# Patient Record
Sex: MALE | Race: WHITE | NOT HISPANIC OR LATINO | URBAN - METROPOLITAN AREA
[De-identification: names, ages, dates, MRNs, and addresses within clinical notes are randomized per-mention and may not be internally consistent; named-entity substitution may affect disease eponyms.]

---

## 2023-01-06 ENCOUNTER — EMERGENCY (EMERGENCY)
Facility: HOSPITAL | Age: 23
LOS: 1 days | Discharge: HOME | End: 2023-01-06
Attending: EMERGENCY MEDICINE
Payer: SELF-PAY

## 2023-01-06 VITALS
RESPIRATION RATE: 20 BRPM | DIASTOLIC BLOOD PRESSURE: 83 MMHG | WEIGHT: 192.9 LBS | OXYGEN SATURATION: 99 % | HEART RATE: 55 BPM | TEMPERATURE: 98 F | SYSTOLIC BLOOD PRESSURE: 123 MMHG

## 2023-01-06 PROCEDURE — 99284 EMERGENCY DEPT VISIT MOD MDM: CPT

## 2023-01-06 PROCEDURE — 99282 EMERGENCY DEPT VISIT SF MDM: CPT

## 2023-01-06 NOTE — ED PROVIDER NOTE - CLINICAL SUMMARY MEDICAL DECISION MAKING FREE TEXT BOX
22 yr old male with no hx presents to ed c/o feeling unwell still after being told he had the flu at [facility] couple days ago and had ct abd/pelvis done last night for persistent abd pain. pt received PPi and pepcid. pt also given maalox which he is taking. no fever. no drug use, no alcohol. pt has appt with gi Monday. no dysuria.    pt with ct work up, labs and already on gi cocktail. pt exam rather unremarkable. hemodynamically stable. instructed to continue with treatment plan and see GI on monday. no clinical concern for intraabd path. could be due to stress induced gastritis vs h pylori. dc

## 2023-01-06 NOTE — ED PROVIDER NOTE - OBJECTIVE STATEMENT
22 yr old male with no hx presents to ed c/o feeling unwell still after being told he had the flu at [facility] couple days ago and had ct abd/pelvis done last night for persistent abd pain. pt received PPi and pepcid. pt also given maalox which he is taking. no fever. no drug use, no alcohol. pt has appt with gi Monday. no dysuria.

## 2023-01-06 NOTE — ED PROVIDER NOTE - PATIENT PORTAL LINK FT
You can access the FollowMyHealth Patient Portal offered by [facility] by registering at the following website: [email or web address]. By joining [facility]’s FollowMyHealth portal, you will also be able to view your health information using other applications (apps) compatible with our system.